# Patient Record
(demographics unavailable — no encounter records)

---

## 2024-11-13 NOTE — PHYSICAL EXAM
[No Acute Distress] : no acute distress [III] : Mallampati Class: III [Normal Appearance] : normal appearance [Supple] : supple [No Neck Mass] : no neck mass [No JVD] : no jvd [Normal Rate/Rhythm] : normal rate/rhythm [Murmur ___ / 6] : murmur [unfilled] / 6 [No Resp Distress] : no resp distress [No Acc Muscle Use] : no acc muscle use [Clear to Auscultation Bilaterally] : clear to auscultation bilaterally [Kyphosis] : kyphosis [Benign] : benign [Not Tender] : not tender [No Masses] : no masses [Soft] : soft [Normal Bowel Sounds] : normal bowel sounds [No Clubbing] : no clubbing [No Edema] : no edema [No Rash] : no rash [No Motor Deficits] : no motor deficits [Normal Affect] : normal affect [TextBox_2] : pleasant f no distress  no cough no sob  [TextBox_11] : crowded no lesion moist  [TextBox_54] : tachy     murmur  systolic [TextBox_80] : leaning forward with walker  [TextBox_99] : slow  walker

## 2024-11-13 NOTE — ASSESSMENT
[FreeTextEntry1] : 88y/o female  1- COPD ex smoker > 30 pack years   last cxr 2/2024  clear  2- KAYLAH    moderate on nasal  cpap    [facility]  3- deconditioning walk with walker 4- HTN ? afib  only baby  aspirin      [facility]  echo 8/2022  LVEF 60-65%  mod-severe AS  Mild RV dil pacer   -refuses   surgery   for cardiac issues               cxr  CHF   responded to lasix  5- vaccinations per primary  Recommendations 1-   d/c symbicort not using 2-  on aspirin   3-  nebs  prn only  4- cardiology   - prevention discussion and   medications reviewed   f

## 2024-11-13 NOTE — PROCEDURE
[FreeTextEntry1] : ACC: [id number] EXAM: XR CHEST PA LAT 2V ORDERED BY: ADELINA BROWN  PROCEDURE DATE: 02/02/2024    INTERPRETATION: Chest radiographs CPT 12840  CLINICAL INFORMATION: Cough. Short of breath.  TECHNIQUE: Frontal and lateral views of the chest were obtained.  FINDINGS: Prior study dated 10/4/2023 was available for review.  The lungs demonstrate stable bilateral apical pleural thickening. No gross consolidation is seen. No pleural effusion is seen.  The heart and mediastinum appear intact. Pacemaker is noted.   IMPRESSION: No focal consolidation is seen. Pacemaker present.  --- End of Report ---      BRYANNA ANTHONY MD; Attending Radiologist This document has been electronically signed. Feb 2 2024 11:24AM10/4/2023  walk test  walker      95%  hr  80  no sob  slow

## 2024-11-13 NOTE — REVIEW OF SYSTEMS
[SOB on Exertion] : sob on exertion [Blood Transfusion] : blood transfusion [Obesity] : obesity [Fever] : no fever [Recent Wt Gain (___ Lbs)] : ~T no recent weight gain [Chills] : no chills [Recent Wt Loss (___ Lbs)] : ~T no recent weight loss [Dry Mouth] : no dry mouth [Cough] : no cough [Hemoptysis] : no hemoptysis [Chest Tightness] : no chest tightness [Sputum] : no sputum [Dyspnea] : no dyspnea [Wheezing] : no wheezing [Chest Discomfort] : no chest discomfort [Palpitations] : no palpitations [GERD] : no gerd [Abdominal Pain] : no abdominal pain [Vomiting] : no vomiting [Dysphagia] : no dysphagia [Bleeding] : no bleeding [Clotting Disorder/ Frequent bleeding] : no clotting disorder/ frequent bleeding [Diabetes] : no diabetes [Thyroid Problem] : no thyroid problem [TextBox_30] : clear  [TextBox_113] : two transfusion    one year ago   [facility] ---    nose bleeds

## 2024-11-13 NOTE — HISTORY OF PRESENT ILLNESS
[Former] : former [>= 20 pack years] : >= 20 pack years [Never] : never [Obstructive Sleep Apnea] : obstructive sleep apnea [Home] : home [TextBox_4] : 11/2022  MM last visit  8/2/2023 85y/o  female    ex smoker ( 17- 50's one pack day   > 30 Pack years )   retired cook    lives  with  son and wife and kids - dog   HTN     COPD   KAYLAH  -was on   inhalers  in past not sure which  -   nebulizer   at  home -  does not want to use so has    no meds - cpap using her machine    nightly    -no old sleep study nor compliance data in chart -no issues -traveling  to [place] to family -  10/4/2023 85y/o  female  ex smoker  COPD  kaylah  on CPAP   - did not see  sleep MD yet - has Symbicort bid  - no chest pain  -   10/24/2023 85y/o  female ex smoker    KAYLAH on cpap  - had cxr called   took lasix  felt better     -- gets new mask  - after this month will own the machine -   3/6/2024 88y/o   female ex smoker   COPD   KAYLAH on cpap      [place] - OOH -pneumonia was in [place]  pneumonia   in hospital  18 days  -currently with walker  -  no sob   -no cough  -cpap broken   will get fixed -  11/13/2024 88y/o  female ex smoker h/o mod-severe AS  afib on  aspirin   only   COPD  KAYLAH CPAP -recommended  surgery for cardiac issues  - [facility]  refused  -  - using   cpap only prn  - difficulty cleaning   cpap  - walker    with  kyphosis - no chest pain no sob    feeling  good overall at baseline -   [TextBox_11] : 1 [TextBox_13] : 30 [YearQuit] : 1990s [TextBox_77] : 8:30  [TextBox_79] : 6 [TextBox_81] : min  [TextBox_83] : no [TextBox_100] : 4/2021 [TextBox_108] : 28 [TextBox_116] : 82 [TextBox_127] : 7/2023 [TextBox_129] : 8/2023 [TextBox_133] : 100% [TextBox_137] : 93% [TextBox_141] : 6 [TextBox_143] : 30 [TextBox_147] : 0.8 [TextBox_165] : autoset   4-20

## 2025-02-12 NOTE — HISTORY OF PRESENT ILLNESS
[FreeTextEntry1] : sore on left ear for 3-4 weeks. No previous history. Sleeps only on that side. [de-identified] : No personal or family history of cutaneous malignancy.  with daughter Marii

## 2025-02-12 NOTE — ASSESSMENT
[FreeTextEntry1] : Alert, oriented, well, pleasant.  pink erythema destiny antihelix left ear. Mildly tender.  Chondrodermatitis nodularis helicis. Options discussed. start mometasone ointment twice per day for 1 month. f/u 1 month. intralesional and biopsy discussed.  c/o dark spots on back. brown yellow papules and plaques back arms face neck. Seborrheic keratoses. No treatment.  c/o red spots arms. erythematous papules abdomen forearms. Angioma no treatment.  Actinic skin damage. Sun protection.

## 2025-03-12 NOTE — ASSESSMENT
[FreeTextEntry1] : fatigue: ddx includes worsening hf, anemia, arrhythmia vs metabolic etiology check labwork ekg today showing afib at rate of 118 cxr  Aortic Stenosis/AFib/HFpEF/PPM/CAD patient has history of increased bleeding with eliquis in the past. now remains on aspirin alone. afib not rate controlled, start metoprolol 25mg 1/2 tab bid c/w lasix 40mg bid fu labwork  aortic senosis- likely severe if was recommended to have TAVR, but patients goals are for conservative management alone.  advised she will need repeat echo and to follow up with cardiologist. HFpEF: suspect acute exacerbation, check labs and chest xray, c/w  lasix 40mg BID and KCL HR uncontrolled- she is no longer taking midodrine-  will ask family to verify  hypokalemia: check home draw c/w kcl for now

## 2025-03-12 NOTE — HISTORY OF PRESENT ILLNESS
[FreeTextEntry1] : 87yoF with pmhx of COPD, KAYLAH, AS, HFpEF, HTN, afib (not on AC due to GI bleed), pacemaker, hx of uterine cancer, seen for acute visit accompanied with her daughter.  Jan 2024 - hospitalized in [place] with heart failure and pneumonia was on Hospice 2024 for 2 months but graduated off.  yesterday and today with increased fatigue and palpitations family was concerned about anemia she reports hx of bleeding, but denies anything recently  taking only baby aspirin, lasix, and KCL  also endorses more gas/bloating denies abdominal pain    functional status: walker inside and outside has shower chair - no grab bars denies any recent falls  COPD: and KAYLAH adherent with cpap machine has not been using oxygen- has been taking mucinex regularly sometimes uses flonase needs albuterol inhaler  Afib: no longer taking eliquis, hx of GI bleed and nose bleeds. reports rapid heart rate after exertion  Aortic Stenosis: was told to have TAVR, but she declines. Goals are for no invasive procedures, medication management alone.  HFpEF: hospitalized in jan for exacerbation in setting of infection edema is improved remains on lasix 40mg BID and KCL 10meq daily  CAD: denies chest pain  hypotension: remains on midodrine 5mg daily denies lightheadedness  no joint pain no open wounds   lives with son in own apartment primary makes own meals, grocery by daughters uses pill box, Ryan fills medication pays bills help with houskeepers   specialists: Dr. Tai - tom Silva - Cardiology in [place] Dr. Griffin - EP pacemaker Dr. Berman - Gastro [facility]  approx 64 with uterine cancer s/p surgery and XRT  acp: states her son and dtr are HCP- they have form at home and will bring in copy MOLST- has DNR, trial of non-invasive DNI at home - advised to bring in copy at next visit

## 2025-03-12 NOTE — REVIEW OF SYSTEMS
[Feeling Tired] : feeling tired [Eyesight Problems] : eyesight problems [Loss Of Hearing] : hearing loss [Palpitations] : palpitations [Lower Ext Edema] : lower extremity edema [SOB on Exertion] : shortness of breath during exertion [As Noted in HPI] : as noted in HPI [Easy Bleeding] : a tendency for easy bleeding [Easy Bruising] : a tendency for easy bruising [Negative] : Integumentary

## 2025-03-12 NOTE — PHYSICAL EXAM
[Alert] : alert [No Acute Distress] : in no acute distress [Sclera] : the sclera and conjunctiva were normal [EOMI] : extraocular movements were intact [Normal Outer Ear/Nose] : the ears and nose were normal in appearance [Normal Appearance] : the appearance of the neck was normal [Normal S1, S2] : normal S1 and S2 [Abdomen Tenderness] : non-tender [Abdomen Soft] : soft [Involuntary Movements] : no involuntary movements were seen [Normal Color / Pigmentation] : normal skin color and pigmentation [Normal Affect] : the affect was normal [Normal Mood] : the mood was normal [de-identified] : decreased breath sounds in bases [de-identified] : systolic murmur PPM in left upper chest [de-identified] : edema of left arm more than right, edema of b/l lower legs

## 2025-03-27 NOTE — ASSESSMENT
[FreeTextEntry1] : Alert, oriented, well, pleasant.  pink erythema papule left helix.  Options discussed. [facility]  Informed consent given. Side effects reviewed. Kenalog 5 mg/cc intralesional. 0.2 ml. Tolerated well. Wound care instructed.  External right ear without erythema or nodule. States pain is in ear. Otoscopic exam of right ear canal, no visible lesion, erythema, or scaling. Pain right ear unknown type. No treatment at present.  F/u with Dr Holland.

## 2025-03-27 NOTE — HISTORY OF PRESENT ILLNESS
[FreeTextEntry1] : f/u [facility] left ear with daughter Marii. A little better. Not compliant with applications of mometasone cream. [de-identified] : Saw Dr oHlland for pain internal canal of right ear. No treatment.

## 2025-04-30 NOTE — ASSESSMENT
[FreeTextEntry1] : Alert, oriented, well, pleasant.  Chondrodermatitis nodularis helicis decreased erythema. Not tender. No surrounding dermatitis.  Improved. Defers additional injection. May use mometasone ointment twice per day as needed. f/u if becomes symptomatic.

## 2025-04-30 NOTE — HISTORY OF PRESENT ILLNESS
[FreeTextEntry1] : f/u [facility]. With daughter Marii. "Better"  Feels the hearing aids are causing the problem.

## 2025-05-14 NOTE — HISTORY OF PRESENT ILLNESS
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  with  son and wife and kids - dog   HTN     COPD   KAYLAH  -was on   inhalers  in past not sure which  -   nebulizer   at  home -  does not want to use so has    no meds - cpap using her machine    nightly    -no old sleep study nor compliance data in chart -no issues -traveling  to [place] to family -  10/4/2023 87y/o  female  ex smoker  COPD  kaylah  on CPAP   - did not see  sleep MD yet - has Symbicort bid  - no chest pain  -   10/24/2023 87y/o  female ex smoker    KAYLAH on cpap  - had cxr called   took lasix  felt better     -- gets new mask  - after this month will own the machine -   3/6/2024 88y/o   female ex smoker   COPD   KAYLAH on cpap      [place] - OOH -pneumonia was in [place]  pneumonia   in hospital  18 days  -currently with walker  -  no sob   -no cough  -cpap broken   will get fixed -  11/13/2024 88y/o  female ex smoker h/o mod-severe AS  afib on  aspirin   only   COPD  KAYLAH CPAP -recommended  surgery for cardiac issues  - [facility]  ref[facility]  -  - using   cpap only prn  - difficulty cleaning   cpap  - walker    with  kyphosis - no chest pain no sob    feeling  good overall at baseline -   5/14/2025 87y/o  female ex smoker  h/o   mod  severe AS afib   on aspirin    on   COPD  KAYLAH   now abn cxr - not using her cpap  as   discussed - wants   albuterol neb - did have cxr    3/2025   new  RLL   airspace disease   -- no abx  no fever no cough refuses further work up  - now with some     TAM    with palpation  -  [TextBox_11] : 1 [TextBox_13] : 30 [YearQuit] : 1990s [TextBox_77] : 8:30  [TextBox_79] : 6 [TextBox_81] : min  [TextBox_83] : no [TextBox_100] : 4/2021 [TextBox_108] : 28 [TextBox_116] : 82 [TextBox_127] : 7/2023 [TextBox_129] : 8/2023 [TextBox_133] : 100% [TextBox_137] : 93% [TextBox_141] : 6 [TextBox_143] : 30 [TextBox_147] : 0.8 [TextBox_165] : autoset   4-20

## 2025-05-14 NOTE — PROCEDURE
[FreeTextEntry1] : ACC: [id number] EXAM: XR CHEST PA LAT 2V ORDERED BY: ADELINA BRONW  PROCEDURE DATE: 02/02/2024    INTERPRETATION: Chest radiographs CPT 20068  CLINICAL INFORMATION: Cough. Short of breath.  TECHNIQUE: Frontal and lateral views of the chest were obtained.  FINDINGS: Prior study dated 10/4/2023 was available for review.  The lungs demonstrate stable bilateral apical pleural thickening. No gross consolidation is seen. No pleural effusion is seen.  The heart and mediastinum appear intact. Pacemaker is noted.   IMPRESSION: No focal consolidation is seen. Pacemaker present.  --- End of Report ---      BRYANNA ANTHONY MD; Attending Radiologist This document has been electronically signed. Feb 2 2024 11:24AM10/4/2023  walk test  walker      95%  hr  80  no sob  slow

## 2025-05-14 NOTE — ASSESSMENT
[FreeTextEntry1] : 89y/o female  1- cxr 3/2025  new RLL  opacity   2- COPD ex smoker > 30 pack years   last cxr 2/2024  clear  3- KAYLAH    moderate on nasal  cpap    [facility]  4- deconditioning walk with walker 5- HTN ? afib  only baby  aspirin      [facility]  echo 8/2022  LVEF 60-65%  mod-severe AS  Mild RV dil pacer   -refuses   surgery   for cardiac issues               cxr  CHF   responded to lasix  5- vaccinations per primary  Recommendations 1-    refused   AV    operation    2- refuses  work up on cxr 3- add prn xopenex  two inh  q  6 hour  4-  on aspirin   5-  levalbuterol  nebs   q    8 hour prn  6-cx/ ct ordered discussed to do   discussion over goals of care  -   daughter states  mother  makes her   decisions  and refuse aggressive care  - prevention discussion and   medications reviewed   f

## 2025-05-14 NOTE — REASON FOR VISIT
[Follow-Up] : a follow-up visit [Sleep Apnea] : sleep apnea [COPD] : COPD [Shortness of Breath] : shortness of breath [Abnormal CXR/ Chest CT] : an abnormal CXR/ chest CT

## 2025-05-14 NOTE — REVIEW OF SYSTEMS
[SOB on Exertion] : sob on exertion [Blood Transfusion] : blood transfusion [Obesity] : obesity [Palpitations] : palpitations [Fever] : no fever [Recent Wt Gain (___ Lbs)] : ~T no recent weight gain [Chills] : no chills [Recent Wt Loss (___ Lbs)] : ~T no recent weight loss [Dry Mouth] : no dry mouth [Cough] : no cough [Hemoptysis] : no hemoptysis [Chest Tightness] : no chest tightness [Sputum] : no sputum [Dyspnea] : no dyspnea [Wheezing] : no wheezing [Chest Discomfort] : no chest discomfort [GERD] : no gerd [Abdominal Pain] : no abdominal pain [Vomiting] : no vomiting [Dysphagia] : no dysphagia [Bleeding] : no bleeding [Clotting Disorder/ Frequent bleeding] : no clotting disorder/ frequent bleeding [Diabetes] : no diabetes [Thyroid Problem] : no thyroid problem [TextBox_30] : clear  [TextBox_113] : two transfusion    one year ago   [facility] ---    nose bleeds

## 2025-05-14 NOTE — PHYSICAL EXAM
[IV] : Mallampati Class: IV [Normal Appearance] : normal appearance [Supple] : supple [No Neck Mass] : no neck mass [No JVD] : no jvd [Normal Rate/Rhythm] : normal rate/rhythm [Normal S1, S2] : normal s1, s2 [No Murmurs] : no murmurs [No Resp Distress] : no resp distress [No Acc Muscle Use] : no acc muscle use [Clear to Auscultation Bilaterally] : clear to auscultation bilaterally [Kyphosis] : kyphosis [Benign] : benign [Not Tender] : not tender [No Hernias] : no hernias [No Rash] : no rash [No Motor Deficits] : no motor deficits [Normal Affect] : normal affect [TextBox_2] : pleasant  frail  f no distress no cough  [TextBox_11] : crowded no lesion moist  [TextBox_99] : frail cane

## 2025-06-12 NOTE — HISTORY OF PRESENT ILLNESS
[FreeTextEntry1] : 88yoF with pmhx of COPD, KAYLAH, AS, HFpEF, HTN, afib (not on AC due to GI bleed), pacemaker, hx of uterine cancer, seen for acute visit accompanied with her daughter.  copd: has been taking levalbuterol nebulizer stil with a lot of mucus xray neg 5/29 not using oxygen  afib: adherent with metoprolol 25mg 1/2 tab bid not on ac for bi beed  severe AS: not taking midodrine currently has been feeling like "i'm going to pass out" especially during the night when she gets up to use the bathroom she has SOB at rest and with minimal exertion has HFpEF and remains on lasix BID as well as KCL also with more cough  anxiety: was taking lorazpeam RDT with hospice 0.5mg  prn for anxiety which helped needing refill  Jan 2024 - hospitalized in [place] with heart failure and pneumonia was on Hospice 2024 for 2 months but graduated off in summer of 2024.   family was concerned about anemia she reports hx of bleeding, but denies anything recently

## 2025-06-12 NOTE — PHYSICAL EXAM
[Alert] : alert [Sclera] : the sclera and conjunctiva were normal [EOMI] : extraocular movements were intact [Normal Outer Ear/Nose] : the ears and nose were normal in appearance [Normal Appearance] : the appearance of the neck was normal [Normal S1, S2] : normal S1 and S2 [Involuntary Movements] : no involuntary movements were seen [No Focal Deficits] : no focal deficits [Normal Affect] : the affect was normal [de-identified] : pursed lip breathing, reduced breath sounds in left base [de-identified] : systolic murmur PPM [de-identified] : edema of left arm slightly more than right at baseline, mild edema of b/l legs [de-identified] : Lilia

## 2025-06-12 NOTE — ASSESSMENT
[FreeTextEntry1] : sob:  ddx includes copd exac vs worsening hf, anemia, metabolic etiology check labwork cxr  Aortic Stenosis/AFib/HFpEF/PPM/CAD patient has history of increased bleeding with eliquis in the past. now remains on aspirin alone. cw metoprolol 25mg 1/2 tab bid c/w lasix 40mg bid   aortic stenosis- likely severe if was recommended to have TAVR, but patients goals are for conservative management alone. will try to get home Echo as well HFpEF: suspect acute exacerbation, check labs and chest xray, c/w lasix 40mg BID and KCL will add back midodrine for symptomatic AS in setting of likely low bp as need to c/w lasix check home labs  anxiety: c/w lorazepam prn spoke with pharamcist- they don't have RDT powder- will send for regular tablet istop  #: [id number]  goals of care: severe AS with symtpoms, recommend evaluation with hospice as patient now has progression of disease, they are agreeabe to proceed with hospice evaluation

## 2025-06-12 NOTE — REVIEW OF SYSTEMS
[Feeling Poorly] : feeling poorly [Feeling Tired] : feeling tired [As Noted in HPI] : as noted in HPI [Skin Lesions] : skin lesion [Anxiety] : anxiety [Negative] : Neurological